# Patient Record
Sex: MALE | Race: BLACK OR AFRICAN AMERICAN | NOT HISPANIC OR LATINO | Employment: FULL TIME
[De-identification: names, ages, dates, MRNs, and addresses within clinical notes are randomized per-mention and may not be internally consistent; named-entity substitution may affect disease eponyms.]

---

## 2021-01-06 ENCOUNTER — RECORDS - HEALTHEAST (OUTPATIENT)
Dept: ADMINISTRATIVE | Facility: OTHER | Age: 66
End: 2021-01-06

## 2021-01-07 ENCOUNTER — HOSPITAL ENCOUNTER (OUTPATIENT)
Dept: ULTRASOUND IMAGING | Facility: CLINIC | Age: 66
Discharge: HOME | End: 2021-01-07

## 2021-01-07 DIAGNOSIS — M54.50 LOW BACK PAIN: ICD-10-CM

## 2021-01-07 DIAGNOSIS — M79.606 LEG PAIN: ICD-10-CM

## 2022-05-22 ENCOUNTER — HEALTH MAINTENANCE LETTER (OUTPATIENT)
Age: 67
End: 2022-05-22

## 2022-09-25 ENCOUNTER — HEALTH MAINTENANCE LETTER (OUTPATIENT)
Age: 67
End: 2022-09-25

## 2022-10-17 ENCOUNTER — APPOINTMENT (OUTPATIENT)
Dept: RADIOLOGY | Facility: CLINIC | Age: 67
End: 2022-10-17
Attending: EMERGENCY MEDICINE
Payer: COMMERCIAL

## 2022-10-17 ENCOUNTER — HOSPITAL ENCOUNTER (EMERGENCY)
Facility: CLINIC | Age: 67
Discharge: HOME | End: 2022-10-17
Attending: EMERGENCY MEDICINE | Admitting: EMERGENCY MEDICINE
Payer: COMMERCIAL

## 2022-10-17 VITALS
OXYGEN SATURATION: 99 % | TEMPERATURE: 98.3 F | SYSTOLIC BLOOD PRESSURE: 161 MMHG | HEIGHT: 72 IN | HEART RATE: 74 BPM | WEIGHT: 178 LBS | BODY MASS INDEX: 24.11 KG/M2 | DIASTOLIC BLOOD PRESSURE: 88 MMHG | RESPIRATION RATE: 16 BRPM

## 2022-10-17 DIAGNOSIS — R05.3 CHRONIC COUGH: ICD-10-CM

## 2022-10-17 DIAGNOSIS — R10.11 RUQ ABDOMINAL PAIN: ICD-10-CM

## 2022-10-17 PROBLEM — J45.40 MODERATE PERSISTENT ASTHMA WITHOUT COMPLICATION: Status: ACTIVE | Noted: 2022-09-24

## 2022-10-17 PROBLEM — M54.16 LUMBAR RADICULITIS: Status: ACTIVE | Noted: 2021-04-23

## 2022-10-17 PROBLEM — D12.6 ADENOMATOUS POLYP OF COLON: Status: ACTIVE | Noted: 2018-11-01

## 2022-10-17 LAB
ALBUMIN SERPL-MCNC: 3.7 G/DL (ref 3.5–5)
ALP SERPL-CCNC: 76 U/L (ref 45–120)
ALT SERPL W P-5'-P-CCNC: 29 U/L (ref 0–45)
ANION GAP SERPL CALCULATED.3IONS-SCNC: 9 MMOL/L (ref 5–18)
AST SERPL W P-5'-P-CCNC: 25 U/L (ref 0–40)
BASOPHILS # BLD AUTO: 0 10E3/UL (ref 0–0.2)
BASOPHILS NFR BLD AUTO: 1 %
BILIRUB DIRECT SERPL-MCNC: 0.1 MG/DL
BILIRUB SERPL-MCNC: 0.4 MG/DL (ref 0–1)
BUN SERPL-MCNC: 16 MG/DL (ref 8–22)
CALCIUM SERPL-MCNC: 9.5 MG/DL (ref 8.5–10.5)
CHLORIDE BLD-SCNC: 101 MMOL/L (ref 98–107)
CO2 SERPL-SCNC: 28 MMOL/L (ref 22–31)
CREAT SERPL-MCNC: 1.11 MG/DL (ref 0.7–1.3)
EOSINOPHIL # BLD AUTO: 0.3 10E3/UL (ref 0–0.7)
EOSINOPHIL NFR BLD AUTO: 5 %
ERYTHROCYTE [DISTWIDTH] IN BLOOD BY AUTOMATED COUNT: 13.1 % (ref 10–15)
FLUAV RNA SPEC QL NAA+PROBE: NEGATIVE
FLUBV RNA RESP QL NAA+PROBE: NEGATIVE
GFR SERPL CREATININE-BSD FRML MDRD: 74 ML/MIN/1.73M2
GLUCOSE BLD-MCNC: 124 MG/DL (ref 70–125)
HCT VFR BLD AUTO: 40.3 % (ref 40–53)
HGB BLD-MCNC: 13.7 G/DL (ref 13.3–17.7)
IMM GRANULOCYTES # BLD: 0 10E3/UL
IMM GRANULOCYTES NFR BLD: 0 %
LIPASE SERPL-CCNC: 25 U/L (ref 0–52)
LYMPHOCYTES # BLD AUTO: 1.7 10E3/UL (ref 0.8–5.3)
LYMPHOCYTES NFR BLD AUTO: 36 %
MCH RBC QN AUTO: 28.1 PG (ref 26.5–33)
MCHC RBC AUTO-ENTMCNC: 34 G/DL (ref 31.5–36.5)
MCV RBC AUTO: 83 FL (ref 78–100)
MONOCYTES # BLD AUTO: 0.5 10E3/UL (ref 0–1.3)
MONOCYTES NFR BLD AUTO: 10 %
NEUTROPHILS # BLD AUTO: 2.4 10E3/UL (ref 1.6–8.3)
NEUTROPHILS NFR BLD AUTO: 48 %
NRBC # BLD AUTO: 0 10E3/UL
NRBC BLD AUTO-RTO: 0 /100
PLATELET # BLD AUTO: 239 10E3/UL (ref 150–450)
POTASSIUM BLD-SCNC: 3.8 MMOL/L (ref 3.5–5)
PROT SERPL-MCNC: 6.9 G/DL (ref 6–8)
RBC # BLD AUTO: 4.88 10E6/UL (ref 4.4–5.9)
RSV RNA SPEC NAA+PROBE: NEGATIVE
SARS-COV-2 RNA RESP QL NAA+PROBE: NEGATIVE
SODIUM SERPL-SCNC: 138 MMOL/L (ref 136–145)
WBC # BLD AUTO: 4.9 10E3/UL (ref 4–11)

## 2022-10-17 PROCEDURE — 87637 SARSCOV2&INF A&B&RSV AMP PRB: CPT | Performed by: EMERGENCY MEDICINE

## 2022-10-17 PROCEDURE — 71046 X-RAY EXAM CHEST 2 VIEWS: CPT

## 2022-10-17 PROCEDURE — 82310 ASSAY OF CALCIUM: CPT | Performed by: EMERGENCY MEDICINE

## 2022-10-17 PROCEDURE — 83690 ASSAY OF LIPASE: CPT | Performed by: EMERGENCY MEDICINE

## 2022-10-17 PROCEDURE — 99284 EMERGENCY DEPT VISIT MOD MDM: CPT | Mod: CS,25

## 2022-10-17 PROCEDURE — 82248 BILIRUBIN DIRECT: CPT | Performed by: EMERGENCY MEDICINE

## 2022-10-17 PROCEDURE — C9803 HOPD COVID-19 SPEC COLLECT: HCPCS

## 2022-10-17 PROCEDURE — 85025 COMPLETE CBC W/AUTO DIFF WBC: CPT | Performed by: EMERGENCY MEDICINE

## 2022-10-17 PROCEDURE — 36415 COLL VENOUS BLD VENIPUNCTURE: CPT | Performed by: EMERGENCY MEDICINE

## 2022-10-17 ASSESSMENT — ENCOUNTER SYMPTOMS
NAUSEA: 0
CHILLS: 0
DIAPHORESIS: 0
FEVER: 0
DIARRHEA: 0
SHORTNESS OF BREATH: 0
VOMITING: 0
COUGH: 1
ABDOMINAL PAIN: 1

## 2022-10-17 ASSESSMENT — ACTIVITIES OF DAILY LIVING (ADL): ADLS_ACUITY_SCORE: 35

## 2022-10-17 NOTE — ED PROVIDER NOTES
"EMERGENCY DEPARTMENT ENCOUNTER      NAME: Gus Valladares  AGE: 67 year old male  YOB: 1955  MRN: [id number]  EVALUATION DATE & TIME: 10/17/2022  9:13 AM    PCP: No primary care provider on file.    ED PROVIDER: Reynaldo Pulido M.D.      Chief Complaint   Patient presents with     Abdominal Pain             IMPRESSION  1. RUQ abdominal pain    2. Chronic cough        PLAN  - close PCP follow up  - discharge to home    ED COURSE & MEDICAL DECISION MAKING    ED Course as of 10/17/22 1604   Mon Oct 17, 2022   0930 67yoM with history of HTN, HLD, T2DM, moderate asthma, GERD, chronic low back pain, chronic sinusitis presenting for evaluation of RUQ pain. Reports intermittent RUQ pain with coughing only over the past 2-3 months; no change with eating/drinking. States his dry cough is chronic and unchanged; no difficulty breathing, fevers, sweats, chills. RUQ pain not present now; no nausea, vomiting, diarrhea. Came this morning because \"I've been meaning to get it checked\"; no acute worsening today. Denies any pain or other symptoms currently.    SBP 150s on presentation with otherwise normal vitals. Calm on exam with clear lungs, normal work of breathing, no rib margin tenderness or overlying skin changes, no RUQ tenderness with negative Thornton's and no abdominal tenderness whatsoever, no CVA tenderness, normal neuro exam. Will obtain screening COVID/blood/CXR while continuing to monitor. Doubt acute abdomen or need for abdominal imaging given no pain or tenderness currently. Patient comfortable with this plan; no further questions at this time.   1030 COVID/influenza/RSV swab negative.   1218 Labs unremarkable with normal bilirubin/LFTs/lipase, no SHAHZAD, no anemia, no leukocytosis, no electrolyte abnormality. CXR clear as well with no suggestion of pneumonia or rib abnormality. Patient still asymptomatic on recheck; doubt emergent life-threatening etiology. Suspect mild strain from chronic cough. Ok for " outpatient management. Patient able to tolerate PO and walk without difficulty. Patient comfortable with discharge at this time. Return precautions and need for PCP follow up discussed and understood. No further questions at the time of discharge.       --------------------------------------------------------------------------------   --------------------------------------------------------------------------------     9:27 AM I met with the patient for the initial interview and physical examination. Discussed plan for treatment and workup in the ED.      This patient involved a high degree of complexity in medical decision making, as significant risks were present and assessed.    Broad differential considered for this patient presenting, including but not limited to:  Acute biliary process, hepatitis, pancreatitis, perforated viscus, SBO, COVID-19, chronic cough, pneumonia, sepsis    I wore the following PPE during this patient encounter:  N95 mask, face shield w/ eye protection, gloves, gown    MEDICATIONS GIVEN IN THE EMERGENCY DEPARTMENT  Medications - No data to display      NEW PRESCRIPTIONS STARTED AT TODAY'S ER VISIT  Discharge Medication List as of 10/17/2022 12:04 PM      CONTINUE these medications which have NOT CHANGED    Details   traMADol (ULTRAM) 50 mg tablet [TRAMADOL (ULTRAM) 50 MG TABLET] Take 1 tablet (50 mg total) by mouth every 6 (six) hours as needed for pain., Disp-20 tablet, R-0, Print                 =================================================================      HPI  Patient information was obtained from: Patient     Use of : N/A         Weberantonette Valladares is a 67 year old male with a pertinent history of asthma, DM type II, esophageal reflux, and HTN who presents to this ED via walk-in for evaluation of abdominal pain and cough     Patient reports intermittent right upper quadrant pain and cough over the past 2-3 months. Patient notes that their cough is dry and has not  "changed over the past few months. Patient notes that the abdominal pain does not change with eating or drinking. Patient reports coming to the ED because, \"I've been meaning to get it checked\". Patient denies shortness of breath, fevers, sweats, chills, no nausea, vomiting, diarrhea or any other symptoms or complaints.    REVIEW OF SYSTEMS   Review of Systems   Constitutional: Negative for chills, diaphoresis and fever.   Respiratory: Positive for cough. Negative for shortness of breath.    Gastrointestinal: Positive for abdominal pain (RUQ). Negative for diarrhea, nausea and vomiting.     All other systems reviewed and are negative except as noted above in HPI.        --------------- MEDICAL HISTORY ---------------  PAST MEDICAL HISTORY:  Past Medical History:   Diagnosis Date     Asthma, intermittent 6/1/2012     Esophageal reflux 1/9/2002     Essential hypertension 1/9/2002    Epic     Sinusitis, chronic 10/9/2012    Epic     Statin intolerance 3/16/2015     Type II diabetes mellitus (H) 1/9/2002    Type II or unspecified type diabetes mellitus without mention of complication, not stated as uncontrolled ([facility])     Patient Active Problem List   Diagnosis     Asthma, intermittent     Esophageal reflux     Essential hypertension     Pure hypercholesterolemia     Sinusitis, chronic     Statin intolerance     Type II diabetes mellitus (H)     Adenomatous polyp of colon     Adverse effect of antihyperlipidemic and antiarteriosclerotic drugs, subsequent encounter     Lumbar radiculitis     Moderate persistent asthma without complication       PAST SURGICAL HISTORY:  No past surgical history on file.    CURRENT MEDICATIONS:    No current facility-administered medications for this encounter.    Current Outpatient Medications:      traMADol (ULTRAM) 50 mg tablet, [TRAMADOL (ULTRAM) 50 MG TABLET] Take 1 tablet (50 mg total) by mouth every 6 (six) hours as needed for pain., Disp: 20 tablet, Rfl: 0    ALLERGIES:  Allergies "   Allergen Reactions     Atorvastatin Calcium [Atorvastatin] Unknown     Myalgia     Niacin Unknown     myalgia     Pravastatin Unknown     myalgia     Simvastatin Unknown     myalgia       FAMILY HISTORY:  Reviewed. No pertinent past family history.      SOCIAL HISTORY:   Social History     Socioeconomic History     Marital status:        --------------- PHYSICAL EXAM ---------------  Nursing notes and vitals reviewed by me.  VITALS:  Vitals:    10/17/22 1055 10/17/22 1221 10/17/22 1222 10/17/22 1223   BP: (!) 167/92 (!) 161/88     Pulse: 77 74     Resp: 16 16     Temp: 98.2  F (36.8  C) 98.3  F (36.8  C)     TempSrc: Oral Oral     SpO2: 98% 99%     Weight:   80.7 kg (178 lb)    Height:    1.829 m (6')       PHYSICAL EXAM:    General:  alert, interactive, no distress  Eyes:  conjunctivae clear, conjugate gaze  HENT:  atraumatic, nose with no rhinorrhea, oropharynx clear  Neck:  no meningismus  Cardiovascular:  HR in the 70s during exam, regular rhythm, no murmurs, brisk cap refill  Chest:  no chest wall tenderness  Pulmonary:  no stridor, normal phonation, normal work of breathing, clear lungs bilaterally  Abdomen:  soft, nondistended, nontender  :  no CVA tenderness  Back:  no midline spinal tenderness  Musculoskeletal:  no pretibial edema, no calf tenderness. Gross ROM intact to joints of extremities with no obvious deformities.  Skin:  warm, dry, no rash  Neuro:  awake, alert, answers questions appropriately, follows commands, moves all limbs  Psych:  calm, normal affect      --------------- RESULTS ---------------  LAB:  Reviewed and interpreted by me.  Results for orders placed or performed during the hospital encounter of 10/17/22   XR Chest 2 Views    Impression    IMPRESSION: Stable left lung and hilar granulomas. Heart size appears normal. Lungs appear otherwise clear.   Symptomatic; Unknown Influenza A/B & SARS-CoV2 (COVID-19) Virus PCR Multiplex Nasopharyngeal    Specimen: Nasopharyngeal; Swab    Result Value Ref Range    Influenza A PCR Negative Negative    Influenza B PCR Negative Negative    RSV PCR Negative Negative    SARS CoV2 PCR Negative Negative   Basic metabolic panel   Result Value Ref Range    Sodium 138 136 - 145 mmol/L    Potassium 3.8 3.5 - 5.0 mmol/L    Chloride 101 98 - 107 mmol/L    Carbon Dioxide (CO2) 28 22 - 31 mmol/L    Anion Gap 9 5 - 18 mmol/L    Urea Nitrogen 16 8 - 22 mg/dL    Creatinine 1.11 0.70 - 1.30 mg/dL    Calcium 9.5 8.5 - 10.5 mg/dL    Glucose 124 70 - 125 mg/dL    GFR Estimate 74 >60 mL/min/1.73m2   Result Value Ref Range    Lipase 25 0 - 52 U/L   Hepatic function panel   Result Value Ref Range    Bilirubin Total 0.4 0.0 - 1.0 mg/dL    Bilirubin Direct 0.1 <=0.5 mg/dL    Protein Total 6.9 6.0 - 8.0 g/dL    Albumin 3.7 3.5 - 5.0 g/dL    Alkaline Phosphatase 76 45 - 120 U/L    AST 25 0 - 40 U/L    ALT 29 0 - 45 U/L   CBC with platelets and differential   Result Value Ref Range    WBC Count 4.9 4.0 - 11.0 10e3/uL    RBC Count 4.88 4.40 - 5.90 10e6/uL    Hemoglobin 13.7 13.3 - 17.7 g/dL    Hematocrit 40.3 40.0 - 53.0 %    MCV 83 78 - 100 fL    MCH 28.1 26.5 - 33.0 pg    MCHC 34.0 31.5 - 36.5 g/dL    RDW 13.1 10.0 - 15.0 %    Platelet Count 239 150 - 450 10e3/uL    % Neutrophils 48 %    % Lymphocytes 36 %    % Monocytes 10 %    % Eosinophils 5 %    % Basophils 1 %    % Immature Granulocytes 0 %    NRBCs per 100 WBC 0 <1 /100    Absolute Neutrophils 2.4 1.6 - 8.3 10e3/uL    Absolute Lymphocytes 1.7 0.8 - 5.3 10e3/uL    Absolute Monocytes 0.5 0.0 - 1.3 10e3/uL    Absolute Eosinophils 0.3 0.0 - 0.7 10e3/uL    Absolute Basophils 0.0 0.0 - 0.2 10e3/uL    Absolute Immature Granulocytes 0.0 <=0.4 10e3/uL    Absolute NRBCs 0.0 10e3/uL         RADIOLOGY:  Reviewed by me. Please see official radiology report.  Recent Results (from the past 24 hour(s))   XR Chest 2 Views    Narrative    EXAM: XR CHEST 2 VIEWS  LOCATION: [facility]  DATE/TIME: 10/17/2022 11:42  AM    INDICATION: cough, RUQ pain  COMPARISON: 02/06/2020      Impression    IMPRESSION: Stable left lung and hilar granulomas. Heart size appears normal. Lungs appear otherwise clear.           I, Hiral Reeves, am serving as a scribe to document services personally performed by Dr. Reynaldo Pulido based on my observation and the provider's statements to me. I, Reynaldo Pulido MD attest that Hiral Reeves is acting in a scribe capacity, has observed my performance of the services and has documented them in accordance with my direction.      Reynaldo Pulido MD  10/17/22  Emergency Medicine  [facility] EMERGENCY ROOM  [facility] [facility] [id number] [phone]  Dept: [phone]     Reynaldo Pulido MD  10/17/22 1607

## 2022-10-17 NOTE — DISCHARGE INSTRUCTIONS
As needed for pain control at home, take:  - over-the-counter ibuprofen 800mg by mouth every 8 hours (max dose 2400mg in 24 hours)  - over-the-counter acetaminophen 1g by mouth every 6 hours (max dose 4g in 24 hours)    Follow up with your Primary Care provider in 2 days for a recheck.    Return to the Emergency Department for any difficulty breathing, severe worsening, or any other concerns.

## 2022-10-17 NOTE — ED TRIAGE NOTES
Has chronic pain in low back and down B legs.  Also been coughing and now has pain in right ribs/abd area.  Also complains of 3 weeks right arm numbness.     Triage Assessment     Row Name 10/17/22 0911       Triage Assessment (Adult)    Airway WDL WDL       Respiratory WDL    Respiratory WDL WDL       Skin Circulation/Temperature WDL    Skin Circulation/Temperature WDL WDL       Cardiac WDL    Cardiac WDL WDL       Peripheral/Neurovascular WDL    Peripheral Neurovascular WDL WDL       Cognitive/Neuro/Behavioral WDL    Cognitive/Neuro/Behavioral WDL WDL

## 2023-01-30 ENCOUNTER — HEALTH MAINTENANCE LETTER (OUTPATIENT)
Age: 68
End: 2023-01-30

## 2023-05-08 ENCOUNTER — HEALTH MAINTENANCE LETTER (OUTPATIENT)
Age: 68
End: 2023-05-08

## 2023-06-19 ENCOUNTER — HOSPITAL ENCOUNTER (EMERGENCY)
Facility: CLINIC | Age: 68
Discharge: HOME | End: 2023-06-19
Attending: EMERGENCY MEDICINE | Admitting: EMERGENCY MEDICINE
Payer: COMMERCIAL

## 2023-06-19 ENCOUNTER — APPOINTMENT (OUTPATIENT)
Dept: RADIOLOGY | Facility: CLINIC | Age: 68
End: 2023-06-19
Attending: EMERGENCY MEDICINE
Payer: COMMERCIAL

## 2023-06-19 VITALS
WEIGHT: 190 LBS | RESPIRATION RATE: 18 BRPM | HEIGHT: 69 IN | HEART RATE: 83 BPM | SYSTOLIC BLOOD PRESSURE: 181 MMHG | TEMPERATURE: 97.1 F | BODY MASS INDEX: 28.14 KG/M2 | DIASTOLIC BLOOD PRESSURE: 97 MMHG | OXYGEN SATURATION: 99 %

## 2023-06-19 DIAGNOSIS — M25.511 RIGHT SHOULDER PAIN, UNSPECIFIED CHRONICITY: ICD-10-CM

## 2023-06-19 PROCEDURE — 99283 EMERGENCY DEPT VISIT LOW MDM: CPT

## 2023-06-19 PROCEDURE — 73030 X-RAY EXAM OF SHOULDER: CPT | Mod: RT

## 2023-06-19 PROCEDURE — 250N000013 HC RX MED GY IP 250 OP 250 PS 637: Performed by: EMERGENCY MEDICINE

## 2023-06-19 RX ORDER — LIDOCAINE 4 G/G
1 PATCH TOPICAL ONCE
Status: DISCONTINUED | OUTPATIENT
Start: 2023-06-19 | End: 2023-06-19 | Stop reason: HOSPADM

## 2023-06-19 RX ORDER — LIDOCAINE 50 MG/G
1 PATCH TOPICAL EVERY 24 HOURS
Qty: 10 PATCH | Refills: 0 | Status: SHIPPED | OUTPATIENT
Start: 2023-06-19 | End: 2023-06-29

## 2023-06-19 RX ORDER — ACETAMINOPHEN 325 MG/1
975 TABLET ORAL ONCE
Status: COMPLETED | OUTPATIENT
Start: 2023-06-19 | End: 2023-06-19

## 2023-06-19 RX ADMIN — LIDOCAINE 1 PATCH: 246 PATCH TOPICAL at 05:11

## 2023-06-19 RX ADMIN — ACETAMINOPHEN 975 MG: 325 TABLET ORAL at 05:11

## 2023-06-19 ASSESSMENT — ACTIVITIES OF DAILY LIVING (ADL): ADLS_ACUITY_SCORE: 35

## 2023-06-19 NOTE — ED PROVIDER NOTES
EMERGENCY DEPARTMENT ENCOUNTER      NAME: Gus Valladares  AGE: 67 year old male  YOB: 1955  MRN: [id number]  EVALUATION DATE & TIME: No admission date for patient encounter.    PCP: Mary Sheth    ED PROVIDER: Poly Anne M.D.      Chief Complaint   Patient presents with     Arm Pain         FINAL IMPRESSION:  1. Right shoulder pain, unspecified chronicity        MEDICAL DECISION MAKING:    Pertinent Labs & Imaging studies reviewed. (See chart for details)  ED Course as of 06/19/23 0651   Mon Jun 19, 2023   0503 Afebrile.  Vital signs here are some hypertension.  Patient is coming in with right shoulder pain.  Started few weeks ago.  Has addressed with his primary care doctor.  He is currently going to physical therapy.  They are noting is likely musculoskeletal versus early arthritis.  She states that he was laying at home, he was having some pain and he got concerned so he wanted to come back in here for evaluation.  Has never had imaging.  Says that feels like his shoulder is swollen.  Arm itself is not swollen.  Pain primarily when he lays on his right-hand side or if he lifts his arm above his head.  He is supposed be meeting with orthopedics, they initially had an appointment later today but this was canceled.  He will reschedule.    Exam for patient here with tenderness palpation along his trapezius muscle on the right-hand side with some muscle spasming noted, mild swelling noted in the area of muscle spasm.  Reproduction of pain with palpation in this area.  He is able to actively and passively range his arm though has some discomfort with full extension above his head.  He has good strength in his bilateral lower extremities.  He does not have any size difference.  No erythema, no tenderness to palpation along the right arm.   0511 No sensory deficit.  Good  strength.  Neurovascular intact distally.    Appears this is most likely musculoskeletal.  There is no signs or risk factors  for DVT.  Could be element of some osteoarthritis versus muscle pain and spasm.  He is already following with PT.  He also has plans to follow-up with [facility].  We will get an x-ray here just to evaluate.  Put a lidocaine patch.  He is taking ibuprofen at home which is helpful.  We will give him some Tylenol here.     X-ray here unremarkable.  Significant improvement with the Lidoderm patch.  Pain is now almost completely resolved.  We will send him home with Lidoderm patch, instructed to continue with following up with orthopedics and physical therapy.  Return for any new or worsening symptoms.    Medical Decision Making    History:    Supplemental history from: Documented in chart, if applicable    External Record(s) reviewed: Documented in chart, if applicable.    Work Up:    Chart documentation includes differential considered and any EKGs or imaging independently interpreted by provider, where specified.    In additional to work up documented, I considered the following work up: Documented in chart, if applicable.    External consultation:    Discussion of management with another provider: Documented in chart, if applicable    Complicating factors:    Care impacted by chronic illness: Diabetes and Hypertension    Care affected by social determinants of health: N/A    Disposition considerations: Discharge. I prescribed additional prescription strength medication(s) as charted. See documentation for any additional details.        Critical care: 0 minutes excluding separately billable procedures.  Includes bedside management, time reviewing test results, review of records, discussing the case with staff, documenting the medical record and time spent with family members (or surrogate decision makers) discussing specific treatment issues.          ED COURSE:  4:58 AM I met with the patient, obtained history, performed an initial exam, and discussed options and plan for diagnostics and treatment here in  the ED.  6:01 AM I rechecked and updated patient on results. We discussed the plan for discharge and the patient is agreeable. Reviewed supportive cares, symptomatic treatment, outpatient follow up, and reasons to return to the Emergency Department. Patient to be discharged by ED RN.     The importance of close follow up was discussed. We reviewed warning signs and symptoms, and I instructed Mr. Valladares to return to the emergency department immediately if he develops any new or worsening symptoms. I provided additional verbal discharge instructions. Mr. Valladares expressed understanding and agreement with this plan of care, his questions were answered, and he was discharged in stable condition.     MEDICATIONS GIVEN IN THE EMERGENCY:  Medications   Lidocaine (LIDOCARE) 4 % Patch 1 patch (1 patch Transdermal $Patch/Med Applied 6/19/23 0511)   lidocaine patch in PLACE (has no administration in time range)   acetaminophen (TYLENOL) tablet 975 mg (975 mg Oral $Given 6/19/23 0511)       NEW PRESCRIPTIONS STARTED AT TODAY'S ER VISIT:  Discharge Medication List as of 6/19/2023  6:15 AM      START taking these medications    Details   lidocaine (LIDODERM) 5 % patch Place 1 patch onto the skin every 24 hours for 10 daysDisp-10 patch, R-0Local Print                =================================================================    HPI    Patient information was obtained from: Patient    Use of : N/A        Gus ERWIN Valladares is a 67 year old male with a history of HTN, DM2, who presents to the ED via walk-in for the evaluation of shoulder pain.     Patient reports right shoulder pain that started 2.5-3 weeks ago with some associating swelling. He states that he has been taking Advil without relief. Patient mentions that he has been working with PT recently for this. He did have an appointment with orthopedics scheduled for today but notes it was canceled. Otherwise, he denies any recent falls and travel. No other  "complaints at this time.    REVIEW OF SYSTEMS   Review of Systems   Musculoskeletal:        Positive for right shoulder pain and swelling   All other systems reviewed and are negative.    PAST MEDICAL HISTORY:  Past Medical History:   Diagnosis Date     Asthma, intermittent 6/1/2012     Esophageal reflux 1/9/2002     Essential hypertension 1/9/2002    Epic     Sinusitis, chronic 10/9/2012    Epic     Statin intolerance 3/16/2015     Type II diabetes mellitus (H) 1/9/2002    Type II or unspecified type diabetes mellitus without mention of complication, not stated as uncontrolled ([facility])       PAST SURGICAL HISTORY:  History reviewed. No pertinent surgical history.    CURRENT MEDICATIONS:      Current Facility-Administered Medications:      Lidocaine (LIDOCARE) 4 % Patch 1 patch, 1 patch, Transdermal, Once, Poly Anne MD, 1 patch at 06/19/23 0511     lidocaine patch in PLACE, , Transdermal, Q8H ADA, Poly Anne MD    Current Outpatient Medications:      lidocaine (LIDODERM) 5 % patch, Place 1 patch onto the skin every 24 hours for 10 days, Disp: 10 patch, Rfl: 0     traMADol (ULTRAM) 50 mg tablet, [TRAMADOL (ULTRAM) 50 MG TABLET] Take 1 tablet (50 mg total) by mouth every 6 (six) hours as needed for pain., Disp: 20 tablet, Rfl: 0    ALLERGIES:  Allergies   Allergen Reactions     Atorvastatin Calcium [Atorvastatin] Unknown     Myalgia     Niacin Unknown     myalgia     Pravastatin Unknown     myalgia     Simvastatin Unknown     myalgia       FAMILY HISTORY:  History reviewed. No pertinent family history.    SOCIAL HISTORY:   Social History     Socioeconomic History     Marital status:        PHYSICAL EXAM:    Vitals: BP (!) 181/97   Pulse 83   Temp 97.1  F (36.2  C) (Temporal)   Resp 18   Ht 1.753 m (5' 9\")   Wt 86.2 kg (190 lb)   SpO2 99%   BMI 28.06 kg/m     General:. Alert and interactive, comfortable appearing.  HENT: Oropharynx without erythema or exudates. MMM.  TMs clear bilaterally.  Eyes: " Pupils mid-sized and equally reactive.   Neck: Full AROM.  No midline tenderness to palpation.  Cardiovascular: Regular rate and rhythm. Peripheral pulses 2+ bilaterally.  Chest/Pulmonary: Normal work of breathing. Lung sounds clear and equal throughout, no wheezes or crackles. No chest wall tenderness or deformities.  Abdomen: Soft, nondistended. Nontender without guarding or rebound.  Back/Spine: No CVA or midline tenderness.  Extremities: Normal ROM of all major joints. No lower extremity edema. Tenderness to palpation along trapezius muscle on the right-hand side with some muscle spasming noted, mild swelling noted in the area of muscle spasm. Reproduction of pin with palpation in this area. Able to actively and passively range arm though has some discomfort with full extension above his head. No size difference to extremities. No erythema or tenderness to palpation to right arm.  Skin: Warm and dry. Normal skin color.   Neuro: Speech clear. CNs grossly intact. Moves all extremities appropriately. Strength and sensation grossly intact to all extremities.   Psych: Normal affect/mood, cooperative, memory appropriate.     LAB:  All pertinent labs reviewed and interpreted.  Labs Ordered and Resulted from Time of ED Arrival to Time of ED Departure - No data to display    RADIOLOGY:  XR Shoulder Right G/E 3 Views   Final Result   IMPRESSION: Normal joint spaces and alignment. No fracture or dislocation.              I, Olga Arrieta, am serving as a scribe to document services personally performed by Dr. Poly Anne  based on my observation and the provider's statements to me. I, Poly Anne MD attest that Olga Arrieta is acting in a scribe capacity, has observed my performance of the services and has documented them in accordance with my direction.      Poly Anne M.D.  Emergency Medicine  [facility] EMERGENCY ROOM  [address]  [id number]  [phone]  Dept: [phone]     Poly Anne MD  06/19/23 0652

## 2023-06-19 NOTE — DISCHARGE INSTRUCTIONS
Please use lidocaine patches for discomfort.  Follow-up with your primary doctor, your physical therapist and orthopedic surgery.

## 2023-06-19 NOTE — ED TRIAGE NOTES
Pt reports having right arm pain for few weeks, has been seeing physical therapy. It has helped, but pt reports tonight it hurts and swelling is present. Pt did not have any falls recently. Pt did take advil earlier tonight. Pt able to move right arm, but discomfort when raising past shoulders.      Triage Assessment     Row Name 06/19/23 0449       Triage Assessment (Adult)    Airway WDL WDL       Respiratory WDL    Respiratory WDL WDL       Skin Circulation/Temperature WDL    Skin Circulation/Temperature WDL WDL       Cardiac WDL    Cardiac WDL WDL       Peripheral/Neurovascular WDL    Peripheral Neurovascular WDL WDL       Cognitive/Neuro/Behavioral WDL    Cognitive/Neuro/Behavioral WDL WDL

## 2023-10-14 ENCOUNTER — HEALTH MAINTENANCE LETTER (OUTPATIENT)
Age: 68
End: 2023-10-14

## 2024-02-05 ENCOUNTER — APPOINTMENT (OUTPATIENT)
Dept: RADIOLOGY | Facility: CLINIC | Age: 69
End: 2024-02-05
Attending: EMERGENCY MEDICINE
Payer: COMMERCIAL

## 2024-02-05 ENCOUNTER — HOSPITAL ENCOUNTER (EMERGENCY)
Facility: CLINIC | Age: 69
Discharge: HOME | End: 2024-02-05
Attending: EMERGENCY MEDICINE | Admitting: EMERGENCY MEDICINE
Payer: COMMERCIAL

## 2024-02-05 VITALS
HEART RATE: 71 BPM | DIASTOLIC BLOOD PRESSURE: 86 MMHG | RESPIRATION RATE: 17 BRPM | OXYGEN SATURATION: 99 % | TEMPERATURE: 98 F | SYSTOLIC BLOOD PRESSURE: 129 MMHG

## 2024-02-05 DIAGNOSIS — R07.89 ATYPICAL CHEST PAIN: ICD-10-CM

## 2024-02-05 DIAGNOSIS — S86.811A STRAIN OF CALF MUSCLE, RIGHT, INITIAL ENCOUNTER: ICD-10-CM

## 2024-02-05 LAB
ALBUMIN UR-MCNC: NEGATIVE MG/DL
ANION GAP SERPL CALCULATED.3IONS-SCNC: 10 MMOL/L (ref 7–15)
APPEARANCE UR: CLEAR
ATRIAL RATE - MUSE: 90 BPM
BACTERIA #/AREA URNS HPF: ABNORMAL /HPF
BASOPHILS # BLD AUTO: 0 10E3/UL (ref 0–0.2)
BASOPHILS NFR BLD AUTO: 1 %
BILIRUB UR QL STRIP: NEGATIVE
BUN SERPL-MCNC: 14.8 MG/DL (ref 8–23)
CALCIUM SERPL-MCNC: 9.4 MG/DL (ref 8.8–10.2)
CHLORIDE SERPL-SCNC: 101 MMOL/L (ref 98–107)
COLOR UR AUTO: ABNORMAL
CREAT SERPL-MCNC: 0.81 MG/DL (ref 0.67–1.17)
D DIMER PPP FEU-MCNC: 0.41 UG/ML FEU (ref 0–0.5)
DEPRECATED HCO3 PLAS-SCNC: 28 MMOL/L (ref 22–29)
DIASTOLIC BLOOD PRESSURE - MUSE: 84 MMHG
EGFRCR SERPLBLD CKD-EPI 2021: >90 ML/MIN/1.73M2
EOSINOPHIL # BLD AUTO: 0.1 10E3/UL (ref 0–0.7)
EOSINOPHIL NFR BLD AUTO: 1 %
ERYTHROCYTE [DISTWIDTH] IN BLOOD BY AUTOMATED COUNT: 13.2 % (ref 10–15)
GLUCOSE SERPL-MCNC: 116 MG/DL (ref 70–99)
GLUCOSE UR STRIP-MCNC: NEGATIVE MG/DL
HCT VFR BLD AUTO: 39.2 % (ref 40–53)
HGB BLD-MCNC: 13.4 G/DL (ref 13.3–17.7)
HGB UR QL STRIP: NEGATIVE
IMM GRANULOCYTES # BLD: 0 10E3/UL
IMM GRANULOCYTES NFR BLD: 0 %
INTERPRETATION ECG - MUSE: NORMAL
KETONES UR STRIP-MCNC: NEGATIVE MG/DL
LEUKOCYTE ESTERASE UR QL STRIP: NEGATIVE
LYMPHOCYTES # BLD AUTO: 1.3 10E3/UL (ref 0.8–5.3)
LYMPHOCYTES NFR BLD AUTO: 22 %
MAGNESIUM SERPL-MCNC: 2 MG/DL (ref 1.7–2.3)
MCH RBC QN AUTO: 28.2 PG (ref 26.5–33)
MCHC RBC AUTO-ENTMCNC: 34.2 G/DL (ref 31.5–36.5)
MCV RBC AUTO: 83 FL (ref 78–100)
MONOCYTES # BLD AUTO: 0.5 10E3/UL (ref 0–1.3)
MONOCYTES NFR BLD AUTO: 8 %
MUCOUS THREADS #/AREA URNS LPF: PRESENT /LPF
NEUTROPHILS # BLD AUTO: 4 10E3/UL (ref 1.6–8.3)
NEUTROPHILS NFR BLD AUTO: 68 %
NITRATE UR QL: NEGATIVE
NRBC # BLD AUTO: 0 10E3/UL
NRBC BLD AUTO-RTO: 0 /100
P AXIS - MUSE: -13 DEGREES
PH UR STRIP: 5.5 [PH] (ref 5–7)
PLATELET # BLD AUTO: 223 10E3/UL (ref 150–450)
POTASSIUM SERPL-SCNC: 3.6 MMOL/L (ref 3.4–5.3)
PR INTERVAL - MUSE: 134 MS
QRS DURATION - MUSE: 74 MS
QT - MUSE: 334 MS
QTC - MUSE: 408 MS
R AXIS - MUSE: -2 DEGREES
RBC # BLD AUTO: 4.75 10E6/UL (ref 4.4–5.9)
RBC URINE: <1 /HPF
SODIUM SERPL-SCNC: 139 MMOL/L (ref 135–145)
SP GR UR STRIP: 1.02 (ref 1–1.03)
SQUAMOUS EPITHELIAL: 2 /HPF
SYSTOLIC BLOOD PRESSURE - MUSE: 137 MMHG
T AXIS - MUSE: 9 DEGREES
TROPONIN T SERPL HS-MCNC: 21 NG/L
TROPONIN T SERPL HS-MCNC: 22 NG/L
UROBILINOGEN UR STRIP-MCNC: <2 MG/DL
VENTRICULAR RATE- MUSE: 90 BPM
WBC # BLD AUTO: 5.9 10E3/UL (ref 4–11)
WBC URINE: 1 /HPF

## 2024-02-05 PROCEDURE — 71046 X-RAY EXAM CHEST 2 VIEWS: CPT

## 2024-02-05 PROCEDURE — 99285 EMERGENCY DEPT VISIT HI MDM: CPT | Mod: 25

## 2024-02-05 PROCEDURE — 85025 COMPLETE CBC W/AUTO DIFF WBC: CPT | Performed by: EMERGENCY MEDICINE

## 2024-02-05 PROCEDURE — 80048 BASIC METABOLIC PNL TOTAL CA: CPT | Performed by: EMERGENCY MEDICINE

## 2024-02-05 PROCEDURE — 81003 URINALYSIS AUTO W/O SCOPE: CPT | Performed by: EMERGENCY MEDICINE

## 2024-02-05 PROCEDURE — 85379 FIBRIN DEGRADATION QUANT: CPT | Performed by: EMERGENCY MEDICINE

## 2024-02-05 PROCEDURE — 36415 COLL VENOUS BLD VENIPUNCTURE: CPT | Performed by: EMERGENCY MEDICINE

## 2024-02-05 PROCEDURE — 84484 ASSAY OF TROPONIN QUANT: CPT | Performed by: EMERGENCY MEDICINE

## 2024-02-05 PROCEDURE — 93005 ELECTROCARDIOGRAM TRACING: CPT | Performed by: EMERGENCY MEDICINE

## 2024-02-05 PROCEDURE — 250N000013 HC RX MED GY IP 250 OP 250 PS 637: Performed by: EMERGENCY MEDICINE

## 2024-02-05 PROCEDURE — 83735 ASSAY OF MAGNESIUM: CPT | Performed by: EMERGENCY MEDICINE

## 2024-02-05 RX ORDER — ASPIRIN 81 MG/1
324 TABLET, CHEWABLE ORAL ONCE
Status: COMPLETED | OUTPATIENT
Start: 2024-02-05 | End: 2024-02-05

## 2024-02-05 RX ADMIN — ASPIRIN 81 MG CHEWABLE TABLET 324 MG: 81 TABLET CHEWABLE at 07:00

## 2024-02-05 ASSESSMENT — ACTIVITIES OF DAILY LIVING (ADL)
ADLS_ACUITY_SCORE: 35
ADLS_ACUITY_SCORE: 35

## 2024-02-05 NOTE — ED PROVIDER NOTES
EMERGENCY DEPARTMENT ENCOUNTER      NAME: Gus Valladares  AGE: 68 year old male  YOB: 1955  MRN: [id number]  EVALUATION DATE & TIME: 2/5/2024  6:11 AM    PCP: Mary Sheth    ED PROVIDER: Michelle Middleton MD      Chief Complaint   Patient presents with    Leg Swelling    Leg Pain    Chest Pain         FINAL IMPRESSION:  1. Strain of calf muscle, right, initial encounter    2. Atypical chest pain          ED COURSE & MEDICAL DECISION MAKING:    Pertinent Labs & Imaging studies reviewed. (See chart for details)  68 year old male presents to the Emergency Department for evaluation of right-sided calf pain, this started about 4 to 5 days ago.  He describes it as a crampy sensation, tight with ambulation.  He also has had achy chest discomfort intermittently lasting for seconds to 1 minute over the last 4 to 5 days.  It occurs spontaneously, is possibly pleuritic.  He otherwise denies any associated symptoms.  No history of DVT or PE.  On my exam, no significant swelling of the right lower extremity.  He does have point tenderness in his right calf.  No reproducible anterior chest wall tenderness.  Patient with a history of type 2 diabetes.  No significant risk factors for DVT.  Planfor D-dimer as my suspicion for DVT/PE is low.  Will also plan for cardiac workup in a diabetic with atypical symptoms.  ECG with no acute ischemic changes.    Initial troponin is unremarkable.  D-dimer is negative.  Chest x-ray is unremarkable.  Given negative workup patient with discharge home with likely musculoskeletal strain of the right calf and anterior chest wall.  Recommend close follow-up with PCP.  Patient is comfortable this plan.    6:15 AM I introduced myself to the patient, obtained a history, and performed an initial exam. We discussed care options for the patient's ED stay.  8:15 AM I updated the patient on lab and imaging results.   9:32 AM We discussed the plan for discharge and the patient is agreeable.  "Reviewed supportive cares, symptomatic treatment, outpatient follow up, and reasons to return to the Emergency Department.   At the conclusion of the encounter I discussed the results of all of the tests and the disposition. The questions were answered. The patient or family acknowledged understanding and was agreeable with the care plan.         Medical Decision Making  Obtained supplemental history:Supplemental history obtained?: No  Reviewed external records: External records reviewed?: No  Care impacted by chronic illness:Diabetes and Hypertension  Care significantly affected by social determinants of health:Access to Medical Care  Did you consider but not order tests?: Work up considered but not performed and documented in chart, if applicable  Did you interpret images independently?: Independent interpretation of ECG and images noted in documentation, when applicable.  Consultation discussion with other provider:Did you involve another provider (consultant, , pharmacy, etc.)?: No  Discharge. No recommendations on prescription strength medication(s). See documentation for any additional details.      MEDICATIONS GIVEN IN THE EMERGENCY:  Medications   aspirin (ASA) chewable tablet 324 mg (324 mg Oral $Given 2/5/24 0700)       NEW PRESCRIPTIONS STARTED AT TODAY'S ER VISIT  Discharge Medication List as of 2/5/2024  9:47 AM             =================================================================    HPI    Patient information was obtained from: Patient     Use of : N/A         Weber ERWIN Valladares is a 68 year old male with a pertinent history of T2DM, hypertension, who presents to this ED via walk-in for evaluation of right calf pain and intermittent chest pain.     The patient reports that for the past 4-5 days, he has been experiencing pain in his right calf, which he describes as feeling \"crampy\" and tight with ambulation. He notes that his right calf \"just started hurting\" and cannot identify any " "provoking factors. Patient mentions that he experienced similar symptoms \"years ago,\" at which time he did not have a blood clot.    Patient also endorses intermittent, achey chest pain. He says that this chest pain started on the right side of his chest and has not moved since its onset 4-5 days ago. His chest pain waxes and wanes spontaneously and each episode lasts for a few seconds to 1 minute. Patient mentions that he can feel this right-sided chest pain when he takes deep breaths. He also notes that his blood sugar was 147 yesterday, which is lower than his baseline of 200-300.     Denies lightheadedness, dizziness, shortness of breath, nausea, diaphoresis, tobacco use, or any other concerns at this time. No recent travel, bone fractures, injuries, muscle strains, or falls. No PMHx of cancers or blood clots.     Patient also mentions that he has been having more recent acid reflux episodes since his surgery in September 2023 (4 months ago). At this time, he was taken off his medications for acid reflux and endorses ongoing nausea. Patient also notes that \"years ago,\" he was told he had a small heart blockage. Per chart review, Cardiac Stress Test from 2009 shows Impression: Mild reversible anteroseptal ischemia and moderate reversible inferior ischemia.           PAST MEDICAL HISTORY:  Past Medical History:   Diagnosis Date    Asthma, intermittent 6/1/2012    Esophageal reflux 1/9/2002    Essential hypertension 1/9/2002    Epic    Sinusitis, chronic 10/9/2012    Epic    Statin intolerance 3/16/2015    Type II diabetes mellitus (H) 1/9/2002    Type II or unspecified type diabetes mellitus without mention of complication, not stated as uncontrolled ([facility])       PAST SURGICAL HISTORY:  No past surgical history on file.        CURRENT MEDICATIONS:    traMADol (ULTRAM) 50 mg tablet        ALLERGIES:  Allergies   Allergen Reactions    Atorvastatin Calcium [Atorvastatin] Unknown     Myalgia    Niacin Unknown     " myalgia    Pravastatin Unknown     myalgia    Simvastatin Unknown     myalgia       FAMILY HISTORY:  No family history on file.    SOCIAL HISTORY:   Social History     Socioeconomic History    Marital status:        VITALS:  /86   Pulse 71   Temp 98  F (36.7  C) (Temporal)   Resp 17   SpO2 99%     PHYSICAL EXAM    Constitutional: Well developed, Well nourished, NAD  HENT: Normocephalic, Atraumatic, Bilateral external ears normal, Oropharynx normal, mucous membranes moist, Nose normal.   Neck- Normal range of motion, No tenderness, Supple, No stridor.  Eyes: PERRL, EOMI, Conjunctiva normal, No discharge.   Respiratory: Normal breath sounds, No respiratory distress  Cardiovascular: Normal heart rate, Regular rhythm  GI: Bowel sounds normal, Soft, No tenderness,   Musculoskeletal: No edema. Good range of motion in all major joints. Point tenderness to right calf. No reproducible tenderness to palpation of right chest wall.  Integument: Warm, Dry, No erythema, No rash  Neurologic: Alert & oriented x 3, Normal motor function, Normal sensory function, No focal deficits noted. Normal gait.   Psychiatric: Affect normal, Judgment normal, Mood normal.      LAB:  All pertinent labs reviewed and interpreted.  Results for orders placed or performed during the hospital encounter of 02/05/24   XR Chest 2 Views    Impression    IMPRESSION: Negative chest.   Basic metabolic panel   Result Value Ref Range    Sodium 139 135 - 145 mmol/L    Potassium 3.6 3.4 - 5.3 mmol/L    Chloride 101 98 - 107 mmol/L    Carbon Dioxide (CO2) 28 22 - 29 mmol/L    Anion Gap 10 7 - 15 mmol/L    Urea Nitrogen 14.8 8.0 - 23.0 mg/dL    Creatinine 0.81 0.67 - 1.17 mg/dL    GFR Estimate >90 >60 mL/min/1.73m2    Calcium 9.4 8.8 - 10.2 mg/dL    Glucose 116 (H) 70 - 99 mg/dL   Result Value Ref Range    Troponin T, High Sensitivity 22 <=22 ng/L   D dimer quantitative   Result Value Ref Range    D-Dimer Quantitative 0.41 0.00 - 0.50 ug/mL FEU    Result Value Ref Range    Magnesium 2.0 1.7 - 2.3 mg/dL   UA with Microscopic reflex to Culture    Specimen: Urine, Midstream   Result Value Ref Range    Color Urine Light Yellow Colorless, Straw, Light Yellow, Yellow    Appearance Urine Clear Clear    Glucose Urine Negative Negative mg/dL    Bilirubin Urine Negative Negative    Ketones Urine Negative Negative mg/dL    Specific Gravity Urine 1.020 1.001 - 1.030    Blood Urine Negative Negative    pH Urine 5.5 5.0 - 7.0    Protein Albumin Urine Negative Negative mg/dL    Urobilinogen Urine <2.0 <2.0 mg/dL    Nitrite Urine Negative Negative    Leukocyte Esterase Urine Negative Negative    Bacteria Urine Few (A) None Seen /HPF    Mucus Urine Present (A) None Seen /LPF    RBC Urine <1 <=2 /HPF    WBC Urine 1 <=5 /HPF    Squamous Epithelials Urine 2 (H) <=1 /HPF   CBC with platelets and differential   Result Value Ref Range    WBC Count 5.9 4.0 - 11.0 10e3/uL    RBC Count 4.75 4.40 - 5.90 10e6/uL    Hemoglobin 13.4 13.3 - 17.7 g/dL    Hematocrit 39.2 (L) 40.0 - 53.0 %    MCV 83 78 - 100 fL    MCH 28.2 26.5 - 33.0 pg    MCHC 34.2 31.5 - 36.5 g/dL    RDW 13.2 10.0 - 15.0 %    Platelet Count 223 150 - 450 10e3/uL    % Neutrophils 68 %    % Lymphocytes 22 %    % Monocytes 8 %    % Eosinophils 1 %    % Basophils 1 %    % Immature Granulocytes 0 %    NRBCs per 100 WBC 0 <1 /100    Absolute Neutrophils 4.0 1.6 - 8.3 10e3/uL    Absolute Lymphocytes 1.3 0.8 - 5.3 10e3/uL    Absolute Monocytes 0.5 0.0 - 1.3 10e3/uL    Absolute Eosinophils 0.1 0.0 - 0.7 10e3/uL    Absolute Basophils 0.0 0.0 - 0.2 10e3/uL    Absolute Immature Granulocytes 0.0 <=0.4 10e3/uL    Absolute NRBCs 0.0 10e3/uL   Result Value Ref Range    Troponin T, High Sensitivity 21 <=22 ng/L   ECG 12-LEAD WITH MUSE ([facility])   Result Value Ref Range    Systolic Blood Pressure 137 mmHg    Diastolic Blood Pressure 84 mmHg    Ventricular Rate 90 BPM    Atrial Rate 90 BPM    AK Interval 134 ms    QRS Duration 74 ms      ms    QTc 408 ms    P Axis -13 degrees    R AXIS -2 degrees    T Axis 9 degrees    Interpretation ECG       Sinus rhythm with Premature atrial complexes in a pattern of bigeminy  Possible Inferior infarct , age undetermined  Abnormal ECG  No previous ECGs available  Confirmed by SEE ED PROVIDER NOTE FOR, ECG INTERPRETATION ([id number]),  PITA SCHROEDER ([id number]) on 2/5/2024 8:16:28 AM         RADIOLOGY:  Reviewed all pertinent imaging. Please see official radiology report.  XR Chest 2 Views   Final Result   IMPRESSION: Negative chest.          EKG:    Performed at: 06:18    Impression: Sinus rhythm with premature atrial complexes in a pattern of bigeminy. Nonspecific ST changes in inferolateral leads.    Rate: 90  Rhythm: Sinus rhythm with premature atrial complexes in a pattern of bigeminy.     CA Interval: 134 ms  QRS Interval: 74 ms  QTc Interval: 334/408 ms  ST Changes: Nonspecific ST changes in inferolateral leads.  Comparison: No previous ECGs available.    I have independently reviewed and interpreted the EKG(s) documented above.        I, Layne Sigala, am serving as a scribe to document services personally performed by Michelle Middleton MD based on my observation and the provider's statements to me. I, Michelle Middleton MD, attest that Layne Sigala is acting in a scribe capacity, has observed my performance of the services and has documented them in accordance with my direction.    Michelle Middleton MD  Emergency Medicine  [facility] EMERGENCY ROOM  [address] [place] [id number] [phone]       Michelle Middleton MD  02/05/24 9037

## 2024-02-05 NOTE — ED TRIAGE NOTES
Pt reports having right sided calf pain and chest pain has been off and on. The pain started 4-5 d ago.      Triage Assessment (Adult)       Row Name 02/05/24 0606          Triage Assessment    Airway WDL WDL        Respiratory WDL    Respiratory WDL WDL        Skin Circulation/Temperature WDL    Skin Circulation/Temperature WDL WDL        Cardiac WDL    Cardiac WDL X;chest pain        Peripheral/Neurovascular WDL    Peripheral Neurovascular WDL WDL        Cognitive/Neuro/Behavioral WDL    Cognitive/Neuro/Behavioral WDL WDL

## 2024-03-02 ENCOUNTER — HEALTH MAINTENANCE LETTER (OUTPATIENT)
Age: 69
End: 2024-03-02

## 2024-07-20 ENCOUNTER — HEALTH MAINTENANCE LETTER (OUTPATIENT)
Age: 69
End: 2024-07-20

## 2024-12-01 ENCOUNTER — HEALTH MAINTENANCE LETTER (OUTPATIENT)
Age: 69
End: 2024-12-01

## 2025-03-15 ENCOUNTER — HEALTH MAINTENANCE LETTER (OUTPATIENT)
Age: 70
End: 2025-03-15

## 2025-06-28 ENCOUNTER — HEALTH MAINTENANCE LETTER (OUTPATIENT)
Age: 70
End: 2025-06-28